# Patient Record
Sex: MALE | Race: WHITE
[De-identification: names, ages, dates, MRNs, and addresses within clinical notes are randomized per-mention and may not be internally consistent; named-entity substitution may affect disease eponyms.]

---

## 2017-05-16 ENCOUNTER — HOSPITAL ENCOUNTER (EMERGENCY)
Dept: HOSPITAL 74 - JERFT | Age: 72
Discharge: HOME | End: 2017-05-16
Payer: COMMERCIAL

## 2017-05-16 VITALS — HEART RATE: 88 BPM | DIASTOLIC BLOOD PRESSURE: 67 MMHG | SYSTOLIC BLOOD PRESSURE: 147 MMHG | TEMPERATURE: 98.5 F

## 2017-05-16 VITALS — BODY MASS INDEX: 30.9 KG/M2

## 2017-05-16 DIAGNOSIS — J44.9: ICD-10-CM
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
## 2017-05-16 DIAGNOSIS — S00.83XA: Primary | ICD-10-CM

## 2017-05-16 DIAGNOSIS — I10: ICD-10-CM
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
## 2017-05-16 NOTE — PDOC
History of Present Illness





- General


Chief Complaint: Injury


Stated Complaint: FALL


Time Seen by Provider: 05/16/17 14:02


History Source: Patient





- History of Present Illness


Occurred: reports: this afternoon


Pain Location: reports: face, head





Past History





- Past Medical History


Allergies/Adverse Reactions: 


 Allergies











Allergy/AdvReac Type Severity Reaction Status Date / Time


 


No Known Drug Allergies Allergy   Verified 04/09/14 23:51











Home Medications: 


Ambulatory Orders





Amlodipine Besylate [Norvasc] 5 mg PO DAILY 05/15/13 


Benazepril HCl 20 mg PO DAILY 05/15/13 


Sertraline HCl 50 mg PO DAILY 05/15/13 


Finasteride 5 mg PO DAILY 04/09/14 


Cyclobenzaprine HCl [Flexeril -] 10 mg PO HS PRN #7 tablet 04/10/14 


Naproxen [Naprosyn -] 500 mg PO BID #14 tablet 04/10/14 








Anemia: No


Asthma: No


Cancer: No


Cardiac Disorders: No


CVA: No


COPD: Yes


CHF: No


Dementia: No


Diabetes: No


GI Disorders:  (GB)


 Disorders: Yes ("BLADDER PROBLEMS")


HTN: Yes


Hypercholesterolemia: No


Liver Disease: No


Psychiatric Problems: Yes (SYMPTOMATIC NPH)


Seizures: No


Thyroid Disease: No





- Surgical History


Abdominal Surgery: No


Appendectomy: No


Cardiac Surgery: No


Cholecystectomy: Yes


Lung Surgery: No


Neurologic Surgery: No (NPH)


Orthopedic Surgery: No





- Immunization History


Immunization Up to Date: Yes





- Psycho/Social/Smoking Cessation Hx


Anxiety: No


Suicidal Ideation: No


Smoking Status: No


Smoking History: Never smoked


Have you smoked in the past 12 months: No


Number of Cigarettes Smoked Daily: 0


Information on smoking cessation initiated: No


Hx Alcohol Use: No


Drug/Substance Use Hx: No


Substance Use Type: Alcohol


Hx Substance Use Treatment: No





**Review of Systems





- Review of Systems


HEENTM: No: Blurred Vision


Respiratory: No: Shortness of Breath


Cardiac (ROS): No: Chest Pain


ABD/GI: No: Nausea, Vomiting


Neurological: No: Headache, Seizure, Dizziness





*Physical Exam





- Vital Signs


 Last Vital Signs











Temp Pulse Resp BP Pulse Ox


 


 98.5 F   88   20   147/67   94 L


 


 05/16/17 13:43  05/16/17 13:43  05/16/17 13:43  05/16/17 13:43  05/16/17 13:43














- Physical Exam


Comments: 





05/16/17 14:41


well appearing


General Appearance: Yes: Appropriately Dressed.  No: Apparent Distress


HEENT: positive: Normal Voice, Other (contusion to R temple)


Neck: positive: Supple.  negative: Tender


Integumentary: positive: Dry, Warm


Neurologic: positive: Fully Oriented, Alert, Normal Mood/Affect, Motor Strength 

5/5





ED Treatment Course





- RADIOLOGY


Radiology Studies Ordered: 














 Category Date Time Status


 


 HEAD CT WITHOUT CONTRAST [CT] Stat CT Scan  05/16/17 14:31 Ordered














Medical Decision Making





- Medical Decision Making


05/16/17 14:32


73 yo male, h/o HTN, not on any blood thinners, brought in by family for 

evaluation status post fall.  Patient states while in parking lot this afternoon

, he tripped on pavement and fell, hitting right side of his head against the 

ground.  As per daughter, bystanders reported that patient may have passed out 

though patient denies this.  Denies any headache, dizziness, nausea, vomiting 

or visual changes at this time.  No dizziness or chest pain prior to fall.





See exam





Head injury s/p mec fall


? LOC as per bystanders though pt denies this to me


Not on blood thinners


Asx at this time and stable in ED w/ unremarkable exam (of note, sat recorded 

at 94% at triage, was 96 % on RA in ED), pt reports no sob or CP


-will scan head


-anticipate discharge








05/16/17 14:37








05/16/17 14:41








05/16/17 15:32


CTH read as neg for acute pathology. Pt remains stable and well juan. Will 

discharge at this time to f/u with PMD. Reasons to return d/w pt and family. 





05/16/17 15:33








*DC/Admit/Observation/Transfer


Diagnosis at time of Disposition: 


Minor head injury


Qualifiers:


 Encounter type: initial encounter Qualified Code(s): S00.90XA - Unspecified 

superficial injury of unspecified part of head, initial encounter





- Discharge Dispostion


Disposition: HOME


Condition at time of disposition: Good





- Patient Instructions


Printed Discharge Instructions:  DI for Closed Head Injury


Additional Instructions: 


Your CT was negative today. Please return for worsening of symptoms

## 2019-11-21 ENCOUNTER — HOSPITAL ENCOUNTER (INPATIENT)
Dept: HOSPITAL 74 - JER | Age: 74
LOS: 1 days | Discharge: HOME | DRG: 56 | End: 2019-11-22
Attending: GENERAL ACUTE CARE HOSPITAL | Admitting: GENERAL ACUTE CARE HOSPITAL
Payer: COMMERCIAL

## 2019-11-21 VITALS — BODY MASS INDEX: 31.4 KG/M2

## 2019-11-21 DIAGNOSIS — J02.9: ICD-10-CM

## 2019-11-21 DIAGNOSIS — F41.8: ICD-10-CM

## 2019-11-21 DIAGNOSIS — N40.0: ICD-10-CM

## 2019-11-21 DIAGNOSIS — I10: ICD-10-CM

## 2019-11-21 DIAGNOSIS — G91.2: Primary | ICD-10-CM

## 2019-11-21 DIAGNOSIS — R26.81: ICD-10-CM

## 2019-11-21 DIAGNOSIS — N31.9: ICD-10-CM

## 2019-11-21 DIAGNOSIS — G91.1: ICD-10-CM

## 2019-11-21 DIAGNOSIS — E78.5: ICD-10-CM

## 2019-11-21 DIAGNOSIS — N39.0: ICD-10-CM

## 2019-11-21 DIAGNOSIS — G93.41: ICD-10-CM

## 2019-11-21 LAB
ALBUMIN SERPL-MCNC: 4 G/DL (ref 3.4–5)
ALP SERPL-CCNC: 104 U/L (ref 45–117)
ALT SERPL-CCNC: 30 U/L (ref 13–61)
ANION GAP SERPL CALC-SCNC: 8 MMOL/L (ref 8–16)
APPEARANCE UR: CLEAR
APTT BLD: 35.8 SECONDS (ref 25.2–36.5)
AST SERPL-CCNC: 23 U/L (ref 15–37)
BACTERIA # UR AUTO: 7.4 /HPF
BASOPHILS # BLD: 0.2 % (ref 0–2)
BILIRUB SERPL-MCNC: 0.5 MG/DL (ref 0.2–1)
BILIRUB UR STRIP.AUTO-MCNC: NEGATIVE MG/DL
BUN SERPL-MCNC: 11.7 MG/DL (ref 7–18)
CALCIUM SERPL-MCNC: 9.9 MG/DL (ref 8.5–10.1)
CASTS URNS QL MICRO: 7 /LPF (ref 0–8)
CHLORIDE SERPL-SCNC: 107 MMOL/L (ref 98–107)
CO2 SERPL-SCNC: 25 MMOL/L (ref 21–32)
COLOR UR: YELLOW
CREAT SERPL-MCNC: 1.1 MG/DL (ref 0.55–1.3)
DEPRECATED RDW RBC AUTO: 14.4 % (ref 11.9–15.9)
EOSINOPHIL # BLD: 1 % (ref 0–4.5)
EPITH CASTS URNS QL MICRO: 1.8 /HPF
GLUCOSE SERPL-MCNC: 117 MG/DL (ref 74–106)
HCT VFR BLD CALC: 42.6 % (ref 35.4–49)
HGB BLD-MCNC: 14.5 GM/DL (ref 11.7–16.9)
INR BLD: 1.13 (ref 0.83–1.09)
KETONES UR QL STRIP: (no result)
LEUKOCYTE ESTERASE UR QL STRIP.AUTO: (no result)
LYMPHOCYTES # BLD: 8 % (ref 8–40)
MCH RBC QN AUTO: 29.8 PG (ref 25.7–33.7)
MCHC RBC AUTO-ENTMCNC: 34.2 G/DL (ref 32–35.9)
MCV RBC: 87.2 FL (ref 80–96)
MONOCYTES # BLD AUTO: 17.2 % (ref 3.8–10.2)
NEUTROPHILS # BLD: 73.6 % (ref 42.8–82.8)
NITRITE UR QL STRIP: NEGATIVE
PH BLDV: 7.42 [PH] (ref 7.31–7.41)
PH UR: 5 [PH] (ref 5–8)
PLATELET # BLD AUTO: 198 K/MM3 (ref 134–434)
PMV BLD: 8.3 FL (ref 7.5–11.1)
POTASSIUM SERPLBLD-SCNC: 4 MMOL/L (ref 3.5–5.1)
PROT SERPL-MCNC: 7 G/DL (ref 6.4–8.2)
PROT UR QL STRIP: NEGATIVE
PROT UR QL STRIP: NEGATIVE
PT PNL PPP: 13.3 SEC (ref 9.7–13)
RBC # BLD AUTO: 3 /HPF (ref 0–4)
RBC # BLD AUTO: 4.88 M/MM3 (ref 4–5.6)
SODIUM SERPL-SCNC: 139 MMOL/L (ref 136–145)
SP GR UR: 1.02 (ref 1.01–1.03)
UROBILINOGEN UR STRIP-MCNC: 1 MG/DL (ref 0.2–1)
VENOUS PC02: 35.4 MMHG (ref 38–52)
VENOUS PO2: < 49 MMHG (ref 28–48)
WBC # BLD AUTO: 6.6 K/MM3 (ref 4–10)
WBC # UR AUTO: 10 /HPF (ref 0–5)

## 2019-11-21 RX ADMIN — SODIUM CHLORIDE SCH MLS/HR: 9 INJECTION, SOLUTION INTRAVENOUS at 22:05

## 2019-11-21 RX ADMIN — LISINOPRIL SCH MG: 10 TABLET ORAL at 22:06

## 2019-11-21 NOTE — PDOC
Documentation entered by Deborah Estrella SCRIBE, acting as scribe for David Lizama MD.








David Lizama MD:  This documentation has been prepared by the Sameer greer Nirvannie, SCRIBE, under my direction and personally reviewed by me in its 

entirety.  I confirm that the documentation accurately reflects all work, 

treatment, procedures, and medical decision making performed by me.  





Attending Attestation





- Resident


Resident Name: Noam Mosquera





- ED Attending Attestation


I have performed the following: I have examined & evaluated the patient, The 

case was reviewed & discussed with the resident, I agree w/resident's findings 

& plan, Exceptions are as noted





- HPI


HPI: 





11/21/19 11:13


The patient is a 74 year old male, with a significant past medical history of, 

who presents to the emergency department with, generalized weakness and gait 

disturbance. Patient was in PCP, Dr. Soria office today, at which time he 

was advised to report to the ED for generalized weakness and gait disturbance 

for possible infections vs CVA/TIA origin. While in the ED, patients only 

complaints are weakness, cough, and subjective fevers. 





Patient is not a great historian, but denies any headache, focal weakness or 

change in strength/sensation. He denies any recent nausea, vomiting, diarrhea 

or constipation. He denies any recent chest pain or shortness of breath. He 

denies any recent dysuria, frequency, urgency or hematuria.





Allergies: NKDA


Primary Care Physician: Dr. Hernandez








- Physicial Exam


PE: 





11/21/19 12:09


Agree with resident exam 





- Medical Decision Making





11/21/19 12:09


74-year-old male with multiple medical problems including normal pressure 

hydrocephalus presents emergency department from his primary care physician's 

office after he was found to have a very unsteady gait.  On arrival to the 

emergency department patient is tachycardic to 110 and almost febrile rectally 

to 99.6.





Concern for neurologic etiology such as CVA causing gait instability or due to 

NPH (although Dr. Hernandez reports gait is much worse than baseline) versus 

possible infectious etiology given borderline fever and tachycardia.  Will 

initiate a broad infectious and neurologic work-up.





Anticipate admission.





11/21/19 13:01


CTH stable


W/u with possible UTI? Pt covered with ceftriaxone


Persistent concern for stroke as well, pt to be evaluated by Dr. Briseno (case 

discussed)


MRI brain non con ordered





Case discussed with Dr. Martin Garcia, pt admitted to Dr. Watkins





Case discussed in detail with admitting physician including history, physical 

exam and ancillary studies.





Admitting physician has assumed care for the patient, will follow all pending 

diagnostics and will complete the evaluation and treatment.

## 2019-11-21 NOTE — PDOC
History of Present Illness





- General


Chief Complaint: Weakness


Stated Complaint: Weakness


Time Seen by Provider: 11/21/19 10:09


History Source: Patient


Exam Limitations: No Limitations





- History of Present Illness


Initial Comments: 





11/21/19 11:28


Patient is a 74M with history of HLD, NPH who was sent in today by his PCP Dr Hernandez for increased weakness and unsteady gait. Patient states that he's been 

feeling worse for the past week, and states he has had increasing sore throat 

and cough. Dr Hernandez reports the patient's gait was much worse than prior at 

his visit today, stating that he required holding on to objects for balance. 

Patient endorses associated urinary frequency. Denies fevers, chills, nausea, 

vomiting. Denies headache, neck pain, chest pain, abdominal pain, dysuria, and 

leg pain. Denies recent falls.





Neuro: Hoenig - NYP





Past History





- Past Medical History


Allergies/Adverse Reactions: 


 Allergies











Allergy/AdvReac Type Severity Reaction Status Date / Time


 


No Known Drug Allergies Allergy   Verified 11/21/19 10:18











Home Medications: 


Ambulatory Orders





Benazepril HCl 20 mg PO TID 05/15/13 


Finasteride 5 mg PO DAILY 04/09/14 


Atorvastatin Ca [Lipitor] 20 mg PO HS 11/21/19 


Ranitidine HCl [Zantac] 150 mg PO BID 11/21/19 


Sertraline HCl [Zoloft] 100 mg PO DAILY 11/21/19 


Solifenacin Succinate [Vesicare -] 5 mg PO DAILY 11/21/19 


Tamsulosin HCl [Flomax] 0.4 mg PO DAILY 11/21/19 








Anemia: No


Asthma: No


Cancer: No


Cardiac Disorders: No


CVA: No


COPD: No


CHF: No


Dementia: No


Diabetes: No


GI Disorders:  (GB)


 Disorders: Yes ("BLADDER PROBLEMS")


HTN: Yes


Hypercholesterolemia: Yes


Liver Disease: No


Psychiatric Problems: Yes (SYMPTOMATIC NPH)


Seizures: No


Thyroid Disease: No





- Surgical History


Abdominal Surgery: No


Appendectomy: No


Cardiac Surgery: No


Cholecystectomy: Yes


Lung Surgery: No


Neurologic Surgery: No (NPH)


Orthopedic Surgery: No





- Immunization History


Immunization Up to Date: Yes





- Psycho Social/Smoking Cessation Hx


Smoking Status: No


Smoking History: Never smoked


Have you smoked in the past 12 months: No


Number of Cigarettes Smoked Daily: 0


Hx Alcohol Use: No


Drug/Substance Use Hx: No


Substance Use Type: Alcohol


Hx Substance Use Treatment: No





**Review of Systems





- Review of Systems


Able to Perform ROS?: Yes


Comments:: 





11/21/19 11:32


GENERAL/CONSTITUTIONAL: No fever or chills. +weakness.


HEAD, EYES, EARS, NOSE AND THROAT: No change in vision. No sore throat.


CARDIOVASCULAR: No chest pain or shortness of breath


RESPIRATORY: No cough, wheezing, or hemoptysis.


GASTROINTESTINAL: No nausea, vomiting, diarrhea or constipation.


GENITOURINARY: No dysuria, frequency, or change in urination.


MUSCULOSKELETAL: No joint or muscle swelling or pain. No neck or back pain.


SKIN: No rash


NEUROLOGIC: No headache, vertigo, loss of consciousness, or change in strength/

sensation.


ENDOCRINE: No increased thirst. No abnormal weight change


HEMATOLOGIC/LYMPHATIC: No anemia, easy bleeding, or history of blood clots.


ALLERGIC/IMMUNOLOGIC: No hives or skin allergy.








*Physical Exam





- Vital Signs


 Last Vital Signs











Temp Pulse Resp BP Pulse Ox


 


 99.6 F   110 H  20   133/75   96 


 


 11/21/19 10:00  11/21/19 10:00  11/21/19 10:00  11/21/19 10:00  11/21/19 10:00














- Physical Exam


Comments: 





11/21/19 11:32


GENERAL: Awake, alert, and fully oriented, in no acute distress


HEAD: No signs of trauma, normocephalic, atraumatic


EYES: PERRLA, EOMI, sclera anicteric, conjunctiva clear


ENT: Auricles normal inspection, hearing grossly normal, nares patent, 

oropharynx clear without


exudates. Dry mucosa


NECK: Normal ROM, supple, no lymphadenopathy, JVD, or masses


LUNGS: No distress, speaks full sentences, clear to auscultation bilaterally


HEART: Regular rate and rhythm, normal S1 and S2, no murmurs, rubs or gallops, 

peripheral pulses normal and equal bilaterally.


ABDOMEN: Soft, nontender, normoactive bowel sounds.  No guarding, no rebound.  

No masses


EXTREMITIES: Normal inspection, Normal range of motion, no edema.  No clubbing 

or cyanosis.


NEUROLOGICAL: Cranial nerves II through XII grossly intact.  Normal speech, 

gait deferred after reports of instability, no focal sensorimotor deficits


SKIN: Warm, Dry, normal turgor, no rashes or lesions noted.








ED Treatment Course





- LABORATORY


CBC & Chemistry Diagram: 


 11/21/19 10:15





 11/21/19 10:15





- RADIOLOGY


Radiology Studies Ordered: 














 Category Date Time Status


 


 HEAD CT WITHOUT CONTRAST [CT] Stat CT Scan  11/21/19 10:20 Ordered


 


 CHEST X-RAY PORTABLE* [RAD] Stat Radiology  11/21/19 10:19 Ordered














Medical Decision Making





- Medical Decision Making





11/21/19 11:33


Patient is 74M with history of NPH, HLD here today with weakness and gait 

disturbance. Vitals notable for tachycardia, afebrile. Physical exam shows 

evidence of dry mucosa. DDx includes, but is not limited to: NPH progression, 

CVA, UTI, other infectious source causing sepsis. Broad workup initiated. Dr Hernandez contacted for further historical information, states that he would like 

patient admitted for stroke rule out if negative workup. Will treat with 1L NS.





EKG shows sinus tachycardia. No st elevations/depressions. Normal intervals





CBC normal


CMP normal


Trop negative


VBG reassuring


Lactate negative


Head CT, CXR pending.


11/21/19 12:03


CXR negative for acute pathology


Head CT shows evidence of NPH, no acute stroke


11/21/19 13:01


, suspect dehydration as primary process. Will cover with ceftriaxone for 

possible UTI. Will flu swab.


D/W Dr Garcia, accepted to [facility]'s service.


D/W Finn, no need for stroke bed at this time. Will see.








Discharge





- Discharge Information


Problems reviewed: Yes


Clinical Impression/Diagnosis: 


 NPH (normal pressure hydrocephalus), Gait instability








- Admission


Yes





- Follow up/Referral


Referrals: 


Black Hernandez MD [Primary Care Provider] - 





- Patient Discharge Instructions





- Post Discharge Activity

## 2019-11-21 NOTE — CONSULT
Consult - text type





- Consultation


Consultation Note: 


NEUROLOGY CONSULTATION GREATLY APPRECIATED:





Events reviewed. Serial MRIs since 2006 reviewed. Pt examined with daughters at 

bedside who aide in history.


This 73 yo RH  male  currently lives with his daughter in [place].


8 year history of slowly, progressive gait dysfunction, dramatically worse over 

the last two years. Daughters indicate flexed, shuffling wide-based gait.


Followed at [place] since presenting with stuttering speech 6 years ago. 

Carries diagnosis of "Normal pressure hydrocephalus" (NPH).


Had Lumbar Puncture years ago. Family does not recall results. Treatments never 

offered. 


Progressive urological difficulties with increasing frequency and recent 

incontinence on multiple bladder meds without sig efficicacy. 


Daughters concerned about memory loss and "muddled" thinking. 


Now admitted with 7-10 days of severe worsening of ataxia - holding onto walls, 

onset of incontinence and fevers. 


Found to have 10 WBC in the urine and bronchitic cough - Given IV ceftriaxone x 

1. 





CT of the head and MRI of brain (not yet reported) are reviewed and compared to 

serial studies: Moderate hydrocephalus L > R including lateral and third 

ventricles. No dilation of sylvian fissure appreciated. No sig cortical 

atrophy. Probable old L posterior-temporal ischemic changes. 





LILIAM: Neck supple. Mild megencephaly with frontal bossing. 


NEURO: Mild OMS. Ox x 3. Recalls 1/3 @ 3 min. No frontal release findings. 

Fluent speech. 


           CNII-CNXII: Normal. 


           Motor: No drift, tremor, or cogwheel rigidity. Normal strength 

throughout. Normal reflexes. Toes downgoing.


           Coordination: No FTN dystaxia. 


           Sensation: Reduced vibration feet. Romberg -


           Gait: Frozen, wide-based, spontaneous retropulsion.





Impression: Obstructive hydrocephalus


                Neuroimaging indicates slowly increasing hydrocephalus > 10 

years


                Possible L posterior temporal CVA (old) which may explain 

history of speech changes 


                Neurogenic bladder c/w Obstructive hydrocephalus


                All worsened by Toxic-Metabolic Encephalopathy (TME)





Plan: Continue hydration and antibiotics 


        Check B12, TSH, RPR 


        PT for gait with walker (Rx given to daughter)


        Neuro f/u as outpatient. Possible NS opinion when stable


        Urology consultation for urodynamic studies as outpatient 





Thank you very much,


Nick Briseno MD

## 2019-11-21 NOTE — PN
Teaching Attending Note


Name of Resident: Martin Garcia





ATTENDING PHYSICIAN STATEMENT





I saw and evaluated the patient.


I reviewed the resident's note and discussed the case with the resident.


I agree with the resident's findings and plan as documented.








SUBJECTIVE: Complains of nasal congestion, sore throat. No fever/chills/nausea/

vomiting. Admits to fogginess, urinary incontinence and unsteady gait worsening 

over recent months. 








OBJECTIVE: Low grade Temp - T max 99.6. Hemodynamicaly stable.





 Last Vital Signs











Temp Pulse Resp BP Pulse Ox


 


 99 F   104 H  20   111/84   96 


 


 11/21/19 12:26  11/21/19 12:26  11/21/19 12:26  11/21/19 12:26 11/21/19 12:26








HEENT - Atraumatic, Normocephalic. Some pharyngeal erythema, no exudate. Mild 

tender cervical lymphadenopathy


Heart - S1, S2, RRR


Lungs - Clear to auscultation


Abdomen - Soft, non-tender. Bowel Sounds normal. 


Extremities - no edema, no calf tenderness.


Neuro - AAO x 2-3. Tone/Power normal all extremities. Unsteady wide-based gait. 





 Laboratory Results - last 24 hr











  11/21/19 11/21/19 11/21/19





  10:15 10:15 10:15


 


WBC   6.6 


 


RBC   4.88 


 


Hgb   14.5 


 


Hct   42.6 


 


MCV   87.2 


 


MCH   29.8 


 


MCHC   34.2 


 


RDW   14.4 


 


Plt Count   198  D 


 


MPV   8.3 


 


Absolute Neuts (auto)   4.8 


 


Neutrophils %   73.6 


 


Lymphocytes %   8.0  D 


 


Monocytes %   17.2 H D 


 


Eosinophils %   1.0 


 


Basophils %   0.2 


 


Nucleated RBC %   0 


 


PT with INR  13.30 H  


 


INR  1.13 H  


 


PTT (Actin FS)  35.8  


 


VBG pH   


 


POC VBG pCO2   


 


POC VBG pO2   


 


VBG HCO3   


 


VBG O2 Sat (Jj)   


 


VBG Base Excess   


 


Sodium    139


 


Potassium    4.0


 


Chloride    107


 


Carbon Dioxide    25


 


Anion Gap    8


 


BUN    11.7


 


Creatinine    1.1


 


Est GFR (CKD-EPI)AfAm    76.78


 


Est GFR (CKD-EPI)NonAf    66.24


 


Random Glucose    117 H


 


Lactic Acid   


 


Calcium    9.9


 


Total Bilirubin    0.5


 


AST    23


 


ALT    30


 


Alkaline Phosphatase    104


 


Troponin I    < 0.02


 


Total Protein    7.0


 


Albumin    4.0


 


Urine Color   


 


Urine Appearance   


 


Urine pH   


 


Ur Specific Gravity   


 


Urine Protein   


 


Urine Glucose (UA)   


 


Urine Ketones   


 


Urine Blood   


 


Urine Nitrite   


 


Urine Bilirubin   


 


Urine Urobilinogen   


 


Ur Leukocyte Esterase   


 


Urine WBC (Auto)   


 


Urine RBC (Auto)   


 


Urine Casts (Auto)   


 


U Epithel Cells (Auto)   


 


Urine Bacteria (Auto)   


 


Influenza A (Rapid)   


 


Influenza B (Rapid)   














  11/21/19 11/21/19 11/21/19





  10:15 10:23 12:05


 


WBC   


 


RBC   


 


Hgb   


 


Hct   


 


MCV   


 


MCH   


 


MCHC   


 


RDW   


 


Plt Count   


 


MPV   


 


Absolute Neuts (auto)   


 


Neutrophils %   


 


Lymphocytes %   


 


Monocytes %   


 


Eosinophils %   


 


Basophils %   


 


Nucleated RBC %   


 


PT with INR   


 


INR   


 


PTT (Actin FS)   


 


VBG pH  7.42 H  


 


POC VBG pCO2  35.4 L  


 


POC VBG pO2  < 49 H  


 


VBG HCO3  22.7 L  


 


VBG O2 Sat (Jj)  80.0  


 


VBG Base Excess  -0.7  


 


Sodium   


 


Potassium   


 


Chloride   


 


Carbon Dioxide   


 


Anion Gap   


 


BUN   


 


Creatinine   


 


Est GFR (CKD-EPI)AfAm   


 


Est GFR (CKD-EPI)NonAf   


 


Random Glucose   


 


Lactic Acid   1.4 


 


Calcium   


 


Total Bilirubin   


 


AST   


 


ALT   


 


Alkaline Phosphatase   


 


Troponin I   


 


Total Protein   


 


Albumin   


 


Urine Color   


 


Urine Appearance   


 


Urine pH   


 


Ur Specific Gravity   


 


Urine Protein   


 


Urine Glucose (UA)   


 


Urine Ketones   


 


Urine Blood   


 


Urine Nitrite   


 


Urine Bilirubin   


 


Urine Urobilinogen   


 


Ur Leukocyte Esterase   


 


Urine WBC (Auto)   


 


Urine RBC (Auto)   


 


Urine Casts (Auto)   


 


U Epithel Cells (Auto)   


 


Urine Bacteria (Auto)   


 


Influenza A (Rapid)    Negative


 


Influenza B (Rapid)    Negative














  11/21/19





  12:25


 


WBC 


 


RBC 


 


Hgb 


 


Hct 


 


MCV 


 


MCH 


 


MCHC 


 


RDW 


 


Plt Count 


 


MPV 


 


Absolute Neuts (auto) 


 


Neutrophils % 


 


Lymphocytes % 


 


Monocytes % 


 


Eosinophils % 


 


Basophils % 


 


Nucleated RBC % 


 


PT with INR 


 


INR 


 


PTT (Actin FS) 


 


VBG pH 


 


POC VBG pCO2 


 


POC VBG pO2 


 


VBG HCO3 


 


VBG O2 Sat (Jj) 


 


VBG Base Excess 


 


Sodium 


 


Potassium 


 


Chloride 


 


Carbon Dioxide 


 


Anion Gap 


 


BUN 


 


Creatinine 


 


Est GFR (CKD-EPI)AfAm 


 


Est GFR (CKD-EPI)NonAf 


 


Random Glucose 


 


Lactic Acid 


 


Calcium 


 


Total Bilirubin 


 


AST 


 


ALT 


 


Alkaline Phosphatase 


 


Troponin I 


 


Total Protein 


 


Albumin 


 


Urine Color  Yellow


 


Urine Appearance  Clear


 


Urine pH  5.0


 


Ur Specific Gravity  1.021


 


Urine Protein  Negative


 


Urine Glucose (UA)  Negative


 


Urine Ketones  Trace H


 


Urine Blood  Negative


 


Urine Nitrite  Negative


 


Urine Bilirubin  Negative


 


Urine Urobilinogen  1.0


 


Ur Leukocyte Esterase  1+ H


 


Urine WBC (Auto)  10


 


Urine RBC (Auto)  3


 


Urine Casts (Auto)  7


 


U Epithel Cells (Auto)  1.8


 


Urine Bacteria (Auto)  7.4


 


Influenza A (Rapid) 


 


Influenza B (Rapid) 








 Current Medications











Generic Name Dose Route Start Last Admin





  Trade Name Jodi  PRN Reason Stop Dose Admin


 


Heparin Sodium (Porcine)  5,000 unit  11/21/19 22:00  





  Heparin -  SQ   





  TID [facility]   





     





     





     





     


 


Sodium Chloride  1,000 mls @ 100 mls/hr  11/21/19 13:45  





  Normal Saline -  IV   





  ASDIR [facility]   





     





     





     





     





 


 Home Medications











 Medication  Instructions  Recorded


 


Benazepril HCl 10 mg PO TID 05/15/13


 


Finasteride 5 mg PO DAILY 04/09/14


 


Atorvastatin Ca [Lipitor] 20 mg PO HS 11/21/19


 


Ranitidine HCl [Zantac] 150 mg PO BID 11/21/19


 


Sertraline HCl [Zoloft] 150 mg PO DAILY 11/21/19


 


Solifenacin Succinate [Vesicare -] 5 mg PO DAILY 11/21/19


 


Tamsulosin HCl [Flomax] 0.4 mg PO DAILY 11/21/19

















ASSESSMENT AND PLAN:





74 year old male with history of HTN, HLD, NPH, BPH, and remote TB, presents 

with worsening gait instability over the past 2 months, with associated 

confusion and urinary incontinence.





1. NPH with progression of symptoms - gait instability, confusion, urinary 

incontinence.


CT Brain - confirms dilatation of lateral/3rd/4th ventricles.


Neuro eval requested for further investigations including possible LP.


Continue Vesicare.


PT





2. Pharyngitis, likely viral. Low grade fever - no clear bacterial infection


Recent sick contacts with Strep. Flu neg. 


CXR - no infiltrate. Pharyngeal Swab/RapidStrep and Blood Cx requested.


Received IV Ceftriaxone in ED. 





3. HTN - Continue Benazepril





4. BPH - continue Flomax, Finasteride





5. HLD - on Statin.





6. Depression/Anxiety - on Zoloft.





DVT Px - SCDs. Heparin SQ held in event patient may require LP.

## 2019-11-21 NOTE — HP
CHIEF COMPLAINT: Worsening gait, generalized weakness 





PCP: Dr SATHISH Hernandez 





HISTORY OF PRESENT ILLNESS:


  Pt is a 73 y/o M with a significant PMH of HTN, HLD, NPH, and Tuberculosis (

as a child) who presented to [facility] at the behest of his primary care physician 

due to worsening gait instability. Per patient and daughters at bedside, pt has 

been suffering from a wide-based gait for over 1 year. However, gait has 

progressively gotten worse since this past September. Pt has also been 

experiencing urinary incontinence during this juncture. Furthermore, pt does 

have a recent + exposure to strep throat (daughter along with grandchildren). 

Pt does endorse a sore throat. Endorses subjective fevers. Denies headache, 

changes in vision, numbness/tingling, chest pain, shortness of breath. 





PMH as above


Social- Denies Tobacco/Alcohol/Illicit drug use


Surg- Cholecystectomy


FH- Mother  at 23 due to tuberculous meningitis. Father Colon cancer


NKDA











ER course was notable for:


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 


(2) 1 gm ceftriaxone











Allergies





No Known Drug Allergies Allergy (Verified 19 10:18)


 








HOME MEDICATIONS:


 Home Medications











 Medication  Instructions  Recorded


 


Benazepril HCl 20 mg PO TID 05/15/13


 


Finasteride 5 mg PO DAILY 14


 


Atorvastatin Ca [Lipitor] 20 mg PO HS 19


 


Ranitidine HCl [Zantac] 150 mg PO BID 19


 


Sertraline HCl [Zoloft] 100 mg PO DAILY 19


 


Solifenacin Succinate [Vesicare -] 5 mg PO DAILY 19


 


Tamsulosin HCl [Flomax] 0.4 mg PO DAILY 19








REVIEW OF SYSTEMS


CONSTITUTIONAL: 


present   generalized weakness


HEENT: 


Absent:  rhinorrhea, nasal congestion, throat pain, throat swelling, difficulty 

swallowing, mouth swelling, ear pain, eye pain, visual changes


CARDIOVASCULAR: 


Absent: chest pain, syncope, palpitations, irregular heart rate, lightheadedness

, peripheral edema


RESPIRATORY: 


Absent: cough, shortness of breath, dyspnea with exertion, orthopnea, wheezing, 

stridor, hemoptysis


GASTROINTESTINAL:


Absent: abdominal pain, abdominal distension, nausea, vomiting, diarrhea, 

constipation, melena, hematochezia


GENITOURINARY: 


present:, frequency, incontinence 


MUSCULOSKELETAL: 


Absent: myalgia, arthralgia, joint swelling, back pain, neck pain


SKIN: 


Absent: rash, itching, pallor


HEMATOLOGIC/IMMUNOLOGIC: 


Absent: easy bleeding, easy bruising, lymphadenopathy, frequent infections


ENDOCRINE:


Absent: unexplained weight gain, unexplained weight loss, heat intolerance, 

cold intolerance


NEUROLOGIC: 


present focal weakness  unsteady gait, seizure,  bladder incontinence


PSYCHIATRIC: 


Absent: anxiety, depression, suicidal or homicidal ideation, hallucinations.








PHYSICAL EXAMINATION


 Vital Signs - 24 hr











  19





  10:00 10:37 12:26


 


Temperature 99.6 F  99 F


 


Pulse Rate 110 H  


 


Pulse Rate [   104 H





Left Radial]   


 


Respiratory 20  20





Rate   


 


Blood Pressure 133/75  


 


Blood Pressure   111/84





[Right Arm]   


 


O2 Sat by Pulse 96 96 96





Oximetry (%)   











GENERAL: Pleasant, NAD


HEAD: Normal with no signs of trauma.


EYES: EOMI Sclera Clear 


EARS, NOSE, THROAT: Dry MMM


NECK: No cervical lymphadenopathy appreciated. 


LUNGS: CTA b/l 


HEART: RRR S1S2


ABDOMEN: Soft NDNT No guarding or rigidity  


MUSCULOSKELETAL: FROM


LOWER EXTREMITIES: No significant pedel edema 


NEUROLOGICAL: CN 2-12 are intact. Upper extremities biceps/triceps 5/5. 

Sensation intact to light touch. Reflex 2+. LE: Strength 5/5, Dorsiflexion/

plantar flexion both lower extremities 5+. Sensation intact as well in lower 

extremities. 


PSYCHIATRIC: Cooperative. Good eye contact. Appropriate mood and affect.


SKIN: Warm, dry, normal turgor, no rashes or lesions noted, normal capillary 

refill. 


 Laboratory Results - last 24 hr











  19





  10:15 10:15 10:15


 


WBC   6.6 


 


RBC   4.88 


 


Hgb   14.5 


 


Hct   42.6 


 


MCV   87.2 


 


MCH   29.8 


 


MCHC   34.2 


 


RDW   14.4 


 


Plt Count   198  D 


 


MPV   8.3 


 


Absolute Neuts (auto)   4.8 


 


Neutrophils %   73.6 


 


Lymphocytes %   8.0  D 


 


Monocytes %   17.2 H D 


 


Eosinophils %   1.0 


 


Basophils %   0.2 


 


Nucleated RBC %   0 


 


PT with INR  13.30 H  


 


INR  1.13 H  


 


PTT (Actin FS)  35.8  


 


VBG pH   


 


POC VBG pCO2   


 


POC VBG pO2   


 


VBG HCO3   


 


VBG O2 Sat (Jj)   


 


VBG Base Excess   


 


Sodium    139


 


Potassium    4.0


 


Chloride    107


 


Carbon Dioxide    25


 


Anion Gap    8


 


BUN    11.7


 


Creatinine    1.1


 


Est GFR (CKD-EPI)AfAm    76.78


 


Est GFR (CKD-EPI)NonAf    66.24


 


Random Glucose    117 H


 


Lactic Acid   


 


Calcium    9.9


 


Total Bilirubin    0.5


 


AST    23


 


ALT    30


 


Alkaline Phosphatase    104


 


Troponin I    < 0.02


 


Total Protein    7.0


 


Albumin    4.0


 


Urine Color   


 


Urine Appearance   


 


Urine pH   


 


Ur Specific Gravity   


 


Urine Protein   


 


Urine Glucose (UA)   


 


Urine Ketones   


 


Urine Blood   


 


Urine Nitrite   


 


Urine Bilirubin   


 


Urine Urobilinogen   


 


Ur Leukocyte Esterase   


 


Urine WBC (Auto)   


 


Urine RBC (Auto)   


 


Urine Casts (Auto)   


 


U Epithel Cells (Auto)   


 


Urine Bacteria (Auto)   














  19





  10:15 10:23 12:25


 


WBC   


 


RBC   


 


Hgb   


 


Hct   


 


MCV   


 


MCH   


 


MCHC   


 


RDW   


 


Plt Count   


 


MPV   


 


Absolute Neuts (auto)   


 


Neutrophils %   


 


Lymphocytes %   


 


Monocytes %   


 


Eosinophils %   


 


Basophils %   


 


Nucleated RBC %   


 


PT with INR   


 


INR   


 


PTT (Actin FS)   


 


VBG pH  7.42 H  


 


POC VBG pCO2  35.4 L  


 


POC VBG pO2  < 49 H  


 


VBG HCO3  22.7 L  


 


VBG O2 Sat (Jj)  80.0  


 


VBG Base Excess  -0.7  


 


Sodium   


 


Potassium   


 


Chloride   


 


Carbon Dioxide   


 


Anion Gap   


 


BUN   


 


Creatinine   


 


Est GFR (CKD-EPI)AfAm   


 


Est GFR (CKD-EPI)NonAf   


 


Random Glucose   


 


Lactic Acid   1.4 


 


Calcium   


 


Total Bilirubin   


 


AST   


 


ALT   


 


Alkaline Phosphatase   


 


Troponin I   


 


Total Protein   


 


Albumin   


 


Urine Color    Yellow


 


Urine Appearance    Clear


 


Urine pH    5.0


 


Ur Specific Gravity    1.021


 


Urine Protein    Negative


 


Urine Glucose (UA)    Negative


 


Urine Ketones    Trace H


 


Urine Blood    Negative


 


Urine Nitrite    Negative


 


Urine Bilirubin    Negative


 


Urine Urobilinogen    1.0


 


Ur Leukocyte Esterase    1+ H


 


Urine WBC (Auto)    10


 


Urine RBC (Auto)    3


 


Urine Casts (Auto)    7


 


U Epithel Cells (Auto)    1.8


 


Urine Bacteria (Auto)    7.4











ASSESSMENT/PLAN:


 Pt is a 73 y/o M with a significant PMH of HTN, HLD, NPH, and Tuberculosis (as 

a child) who presented to [facility] at the behest of his primary care physician 

due to worsening gait instability.





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


-CT Head---> Marked to moderate dilation of the lateral and third ventricles 

and a small fourth ventricle is again noted. Findings suggestive of NPH vs. 

Stenosis of  aqueduct of sylvius


-Dr Briseno consulted. Appreciate recs.


-Possible Lumbar Puncture per neurology recs. 








# UTI


-Leuk Est 1+, 10 wbcs. Not significant however may explain patient's altered 

mental status and generalized weakness. 


-Received 1 gram ceftriaxone in ED. 


-Trend CBC.  


-Will send blood cultures of patient spikes temp > 100.4. 


-CXR No infiltrates, congestive changes, or any acute pathology. 





#FEN


-NS@100cc/hr


-Monitor Electrolytes


-Sodium Controlled Diet





#DVT ppx:


-SCDs. No hep as patient may go for lumbar puncture








#Dispo:


-Med-Surg 

















Visit type





- Emergency Visit


Emergency Visit: Yes


ED Registration Date: 19


Care time: The patient presented to the Emergency Department on the above date 

and was hospitalized for further evaluation of their emergent condition.





- New Patient


This patient is new to me today: Yes


Date on this admission: 19





- Critical Care


Critical Care patient: No





ATTENDING PHYSICIAN STATEMENT





I saw and evaluated the patient.


I reviewed the resident's note and discussed the case with the resident.


I agree with the resident's findings and plan as documented.








SUBJECTIVE:








OBJECTIVE:








ASSESSMENT AND PLAN:

## 2019-11-22 VITALS — HEART RATE: 99 BPM | DIASTOLIC BLOOD PRESSURE: 78 MMHG | SYSTOLIC BLOOD PRESSURE: 148 MMHG | TEMPERATURE: 97.8 F

## 2019-11-22 LAB
ALBUMIN SERPL-MCNC: 3.4 G/DL (ref 3.4–5)
ALP SERPL-CCNC: 85 U/L (ref 45–117)
ALT SERPL-CCNC: 26 U/L (ref 13–61)
ANION GAP SERPL CALC-SCNC: 4 MMOL/L (ref 8–16)
APTT BLD: 32.2 SECONDS (ref 25.2–36.5)
AST SERPL-CCNC: 17 U/L (ref 15–37)
BASOPHILS # BLD: 0.3 % (ref 0–2)
BILIRUB SERPL-MCNC: 0.4 MG/DL (ref 0.2–1)
BUN SERPL-MCNC: 19.5 MG/DL (ref 7–18)
CALCIUM SERPL-MCNC: 9.4 MG/DL (ref 8.5–10.1)
CHLORIDE SERPL-SCNC: 110 MMOL/L (ref 98–107)
CO2 SERPL-SCNC: 27 MMOL/L (ref 21–32)
CREAT SERPL-MCNC: 1.2 MG/DL (ref 0.55–1.3)
DEPRECATED RDW RBC AUTO: 14.1 % (ref 11.9–15.9)
EOSINOPHIL # BLD: 1.5 % (ref 0–4.5)
GLUCOSE SERPL-MCNC: 106 MG/DL (ref 74–106)
HCT VFR BLD CALC: 37.9 % (ref 35.4–49)
HGB BLD-MCNC: 12.8 GM/DL (ref 11.7–16.9)
INR BLD: 1.15 (ref 0.83–1.09)
LYMPHOCYTES # BLD: 18 % (ref 8–40)
MAGNESIUM SERPL-MCNC: 2.2 MG/DL (ref 1.8–2.4)
MCH RBC QN AUTO: 29.6 PG (ref 25.7–33.7)
MCHC RBC AUTO-ENTMCNC: 33.8 G/DL (ref 32–35.9)
MCV RBC: 87.3 FL (ref 80–96)
MONOCYTES # BLD AUTO: 16 % (ref 3.8–10.2)
NEUTROPHILS # BLD: 64.2 % (ref 42.8–82.8)
PHOSPHATE SERPL-MCNC: 3.5 MG/DL (ref 2.5–4.9)
PLATELET # BLD AUTO: 181 K/MM3 (ref 134–434)
PMV BLD: 7.7 FL (ref 7.5–11.1)
POTASSIUM SERPLBLD-SCNC: 3.9 MMOL/L (ref 3.5–5.1)
PROT SERPL-MCNC: 6 G/DL (ref 6.4–8.2)
PT PNL PPP: 13.6 SEC (ref 9.7–13)
RBC # BLD AUTO: 4.34 M/MM3 (ref 4–5.6)
SODIUM SERPL-SCNC: 140 MMOL/L (ref 136–145)
WBC # BLD AUTO: 5.1 K/MM3 (ref 4–10)

## 2019-11-22 RX ADMIN — SODIUM CHLORIDE SCH MLS/HR: 9 INJECTION, SOLUTION INTRAVENOUS at 09:07

## 2019-11-22 RX ADMIN — LISINOPRIL SCH MG: 10 TABLET ORAL at 09:08

## 2019-11-22 NOTE — PN
Teaching Attending Note


Name of Resident: Darryl Rowell





ATTENDING PHYSICIAN STATEMENT





I saw and evaluated the patient.


I reviewed the resident's note and discussed the case with the resident.


I agree with the resident's findings and plan as documented.








SUBJECTIVE:No fever/chills/nausea/vomiting. Admits to fogginess, urinary 

incontinence and unsteady gait worsening over recent months. 








OBJECTIVE: Low grade Temp - T max 99.6. Hemodynamically stable.





 Last Vital Signs











Temp Pulse Resp BP Pulse Ox


 


 98.2 F   91 H  20   135/82   99 


 


 11/22/19 10:00  11/22/19 10:00  11/22/19 10:00  11/22/19 10:00  11/22/19 10:00











Heart - S1, S2, RRR


Lungs - Clear to auscultation


Abdomen - Soft, non-tender. Bowel Sounds normal. 


Extremities - no edema, no calf tenderness.


Neuro - AAO x 2-3. Tone/Power normal all extremities. Unsteady wide-based gait. 





 Laboratory Results - last 24 hr











  11/21/19 11/22/19 11/22/19





  14:50 06:30 06:30


 


WBC   5.1 


 


RBC   4.34 


 


Hgb   12.8 


 


Hct   37.9 


 


MCV   87.3 


 


MCH   29.6 


 


MCHC   33.8 


 


RDW   14.1 


 


Plt Count   181 


 


MPV   7.7 


 


Absolute Neuts (auto)   3.3 


 


Neutrophils %   64.2 


 


Lymphocytes %   18.0  D 


 


Monocytes %   16.0 H 


 


Eosinophils %   1.5 


 


Basophils %   0.3 


 


Nucleated RBC %   0 


 


PT with INR    13.60 H


 


INR    1.15 H


 


PTT (Actin FS)    32.2


 


Sodium   


 


Potassium   


 


Chloride   


 


Carbon Dioxide   


 


Anion Gap   


 


BUN   


 


Creatinine   


 


Est GFR (CKD-EPI)AfAm   


 


Est GFR (CKD-EPI)NonAf   


 


Random Glucose   


 


Calcium   


 


Phosphorus   


 


Magnesium   


 


Total Bilirubin   


 


AST   


 


ALT   


 


Alkaline Phosphatase   


 


Total Protein   


 


Albumin   


 


Vitamin B12   


 


TSH   


 


RPR Titer   


 


Group A Strep Rapid  Negative  














  11/22/19 11/22/19





  06:30 11:11


 


WBC  


 


RBC  


 


Hgb  


 


Hct  


 


MCV  


 


MCH  


 


MCHC  


 


RDW  


 


Plt Count  


 


MPV  


 


Absolute Neuts (auto)  


 


Neutrophils %  


 


Lymphocytes %  


 


Monocytes %  


 


Eosinophils %  


 


Basophils %  


 


Nucleated RBC %  


 


PT with INR  


 


INR  


 


PTT (Actin FS)  


 


Sodium  140 


 


Potassium  3.9 


 


Chloride  110 H 


 


Carbon Dioxide  27 


 


Anion Gap  4 L 


 


BUN  19.5 H 


 


Creatinine  1.2 


 


Est GFR (CKD-EPI)AfAm  69.11 


 


Est GFR (CKD-EPI)NonAf  59.63 


 


Random Glucose  106 


 


Calcium  9.4 


 


Phosphorus  3.5 


 


Magnesium  2.2 


 


Total Bilirubin  0.4 


 


AST  17 


 


ALT  26 


 


Alkaline Phosphatase  85 


 


Total Protein  6.0 L 


 


Albumin  3.4 


 


Vitamin B12  307 


 


TSH  0.66 


 


RPR Titer   Nonreactive


 


Group A Strep Rapid  








 Current Medications











Generic Name Dose Route Start Last Admin





  Trade Name Freq  PRN Reason Stop Dose Admin


 


Atorvastatin Calcium  20 mg  11/21/19 22:00  11/21/19 22:06





  Lipitor -  PO   20 mg





  HS KATHIA   Administration





     





     





     





     


 


Benzocaine/Menthol  1 each  11/22/19 08:44  





  Cepacol Lozenge -  MM   





  Q2H PRN   





  SORE THROAT   





     





     





     


 


Finasteride  5 mg  11/22/19 10:00  11/22/19 09:08





  Proscar -  PO   5 mg





  DAILY KATHIA   Administration





     





     





     





     


 


Sodium Chloride  1,000 mls @ 75 mls/hr  11/21/19 18:24  11/22/19 09:07





  Normal Saline -  IV   75 mls/hr





  ASDIR KATHIA   Administration





     





     





     





     


 


Lisinopril  10 mg  11/21/19 22:00  11/22/19 09:08





  Prinivil  PO   10 mg





  BID KATHIA   Administration





     





     





     





     


 


Sertraline HCl  150 mg  11/22/19 10:00  11/22/19 09:07





  Zoloft -  PO   150 mg





  DAILY KATHIA   Administration





     





     





     





     


 


Solifenacin  5 mg  11/22/19 10:00  11/22/19 13:17





  Vesicare -  PO   5 mg





  DAILY KATHIA   Administration





     





     





     





     


 


Tamsulosin HCl  0.4 mg  11/22/19 08:30  11/22/19 08:45





  Flomax -  PO   0.4 mg





  DAILY@0830 KATHIA   Administration





     





     





     





     








 Home Medications











 Medication  Instructions  Recorded


 


Benazepril HCl 10 mg PO TID 05/15/13


 


Finasteride 5 mg PO DAILY 04/09/14


 


Atorvastatin Ca [Lipitor] 20 mg PO HS 11/21/19


 


Ranitidine HCl [Zantac] 150 mg PO BID 11/21/19


 


Sertraline HCl [Zoloft] 150 mg PO DAILY 11/21/19


 


Solifenacin Succinate [Vesicare -] 5 mg PO DAILY 11/21/19


 


Tamsulosin HCl [Flomax] 0.4 mg PO DAILY 11/21/19














ASSESSMENT AND PLAN:





74 year old male with history of HTN, HLD, NPH, BPH, and remote TB, presents 

with worsening gait instability over the past 2 months, with associated 

confusion and urinary incontinence.





1. NPH with progression of symptoms - gait instability, confusion, urinary 

incontinence.


CT Brain/MRI Brain - confirms dilatation of lateral/3rd/4th ventricles, no 

acute findings.


Neuro eval requested - further work-up as out-patient.


Continue Vesicare.


PT eval prior to discharge.


RPR non-reactive


TSH wnl





2. Pharyngitis, likely viral. Low grade fever - no clear bacterial infection


Throat Swab neg for Strep. Flu neg. 


CXR - no infiltrate. Pharyngeal Swab/RapidStrep and Blood Cx requested.


Received IV Ceftriaxone in ED. No further Abx as no evidence of ongoing 

bacterial infection.





3. HTN - Continue Benazepril





4. BPH - continue Flomax, Finasteride





5. HLD - on Statin.





6. Depression/Anxiety - on Zoloft.





7. B12 levels borderline. Will start B12 replacement.





Medically optimized for discharge given no recommendation for in-patient Ix/

intervention by Neuro. Urology for urodynamic studies as per Neuro.

## 2019-11-22 NOTE — EKG
Test Reason : 

Blood Pressure : ***/*** mmHG

Vent. Rate : 109 BPM     Atrial Rate : 109 BPM

   P-R Int : 144 ms          QRS Dur : 100 ms

    QT Int : 350 ms       P-R-T Axes : 045 -07 018 degrees

   QTc Int : 471 ms

 

SINUS TACHYCARDIA

WHEN COMPARED WITH ECG OF 14-MAY-2013 13:58,

VENT. RATE HAS INCREASED BY  46 BPM

Confirmed by ANDRA YANG MD ([id number]) on 11/22/2019 1:24:07 PM

 

Referred By:             Confirmed By:ANDRA YANG MD

## 2019-11-22 NOTE — DS
Physical Exam: 


SUBJECTIVE: Patient seen and examined at the bedside. Patient stated that he 

felt better however noted that he continued to have some difficulties with his 

gait. Also endorsed a sore throat. The patient denied cp, sob, abd pain, n/v/c/d

, fever, chills. 








OBJECTIVE:





 Vital Signs











 Period  Temp  Pulse  Resp  BP Sys/[place]ell  Pulse Ox


 


 Last 24 Hr  97.8 F-99.1 F    20-22  /54-89  96-99








PHYSICAL EXAM





GENERAL: Pleasant, NAD


HEAD: Normal with no signs of trauma.


EYES: EOMI Sclera Clear 


EARS, NOSE, THROAT: Dry MMM


NECK: No cervical lymphadenopathy appreciated. 


LUNGS: CTA b/l 


HEART: RRR S1S2 no murmurs


ABDOMEN: Soft non tender. No guarding or rigidity  


MUSCULOSKELETAL: full range of motion


LOWER EXTREMITIES: No significant pedel edema 


NEUROLOGICAL: CN 2-12 are intact. Upper extremities biceps/triceps 5/5. 

Sensation intact to light touch. Reflex 2+. LE: Strength 5/5, Dorsiflexion/

plantar flexion both lower extremities 5+. Sensation intact as well in lower 

extremities. Wide based ataxic gait


PSYCHIATRIC: Cooperative. Good eye contact. Appropriate mood and affect.


SKIN: Warm, dry, normal turgor, no rashes or lesions noted, normal capillary 

refill. 





LABS


 Laboratory Results - last 24 hr











  11/22/19 11/22/19 11/22/19





  06:30 06:30 06:30


 


WBC  5.1  


 


RBC  4.34  


 


Hgb  12.8  


 


Hct  37.9  


 


MCV  87.3  


 


MCH  29.6  


 


MCHC  33.8  


 


RDW  14.1  


 


Plt Count  181  


 


MPV  7.7  


 


Absolute Neuts (auto)  3.3  


 


Neutrophils %  64.2  


 


Lymphocytes %  18.0  D  


 


Monocytes %  16.0 H  


 


Eosinophils %  1.5  


 


Basophils %  0.3  


 


Nucleated RBC %  0  


 


PT with INR   13.60 H 


 


INR   1.15 H 


 


PTT (Actin FS)   32.2 


 


Sodium    140


 


Potassium    3.9


 


Chloride    110 H


 


Carbon Dioxide    27


 


Anion Gap    4 L


 


BUN    19.5 H


 


Creatinine    1.2


 


Est GFR (CKD-EPI)AfAm    69.11


 


Est GFR (CKD-EPI)NonAf    59.63


 


Random Glucose    106


 


Calcium    9.4


 


Phosphorus    3.5


 


Magnesium    2.2


 


Total Bilirubin    0.4


 


AST    17


 


ALT    26


 


Alkaline Phosphatase    85


 


Total Protein    6.0 L


 


Albumin    3.4


 


Vitamin B12    307


 


TSH    0.66


 


RPR Titer   














  11/22/19





  11:11


 


WBC 


 


RBC 


 


Hgb 


 


Hct 


 


MCV 


 


MCH 


 


MCHC 


 


RDW 


 


Plt Count 


 


MPV 


 


Absolute Neuts (auto) 


 


Neutrophils % 


 


Lymphocytes % 


 


Monocytes % 


 


Eosinophils % 


 


Basophils % 


 


Nucleated RBC % 


 


PT with INR 


 


INR 


 


PTT (Actin FS) 


 


Sodium 


 


Potassium 


 


Chloride 


 


Carbon Dioxide 


 


Anion Gap 


 


BUN 


 


Creatinine 


 


Est GFR (CKD-EPI)AfAm 


 


Est GFR (CKD-EPI)NonAf 


 


Random Glucose 


 


Calcium 


 


Phosphorus 


 


Magnesium 


 


Total Bilirubin 


 


AST 


 


ALT 


 


Alkaline Phosphatase 


 


Total Protein 


 


Albumin 


 


Vitamin B12 


 


TSH 


 


RPR Titer  Nonreactive











HOSPITAL COURSE:





Davy Bennett is a 74 year old male with a past medical history of HTN, HLD, 

NPH, and Tuberculosis (as a child) who presented to [facility] at the behest of his 

primary care physician due to worsening gait instability. The patient was 

admitted for gait instability and likely NPH. Patient had CT done that showed 

marked to moderate dilation of the lateral and third ventciles with small 

fourth ventricle. Dr. Briseno, neurology saw the patient and noted the patient 

should follow up outpatient with neurology, follow up with urology for urinary 

incontinence, and prescribed a walker for home. Patient to go home with home 

PT. MRI head showed moderate dilation of the lateral and third ventricles and 

almost normal 4th ventricle. Blood work analysis was all within normal limits. 

Patient was seen by PT that stated that the patient was safe to go home with 

home physical therapy.


Patient was spoken to and acknowledged the plan and voiced agreement. The 

patient will follow up with primary care, neurology, and urology and use a 

walker.


The patient was discharged in stable medical condition. 





Date of Admission:11/21/19





Date of Discharge: 11/22/19











Minutes to complete discharge: 35





Discharge Summary


Problems reviewed: Yes


Reason For Visit: UNSTEADY GAIT


Condition: Improved





- Instructions


Diet, Activity, Other Instructions: 


You were admitted to the hospital because you were having increasing problems 

with your walking. You had a CT Scan and MRI of your head which did show an 

enlargement of the ventricles (fluid filled spaces) of your brain which may be 

suggestive of your symptoms of trouble walking, memory loss, and increased 

urination. You were seen by the neurologist, Dr. Briseno, who recommended that 

you use a walker to walk at home. Dr. Briseno also recommended that you follow 

up with him as an outpatient, that you follow up with a urologist (urinary and 

bladder system doctor) for studies related to your urination problems.





MEDICATIONS





Please continue taking all of your home medications as prescribed.





REFERRALS





Please follow up with Dr. Black Hernandez, your primary care provider within 1 

week.





Please follow up with Dr. Nick Briseno, neurologist, within 1 week.





Please follow up with Dr. Jessenia Garcia, urologist, within 1 week.








If you have any symptoms of dizziness, lightheadedness, increased confusion, 

falls, fevers, or other general feelings of unwellness please call 911 or go to 

your nearest emergency room.


Referrals: 


Black Hernandez MD [Primary Care Provider] - 1 Week


Nick Briseno MD [Staff Physician] - 1 Week


Jessenia Garcia MD [Staff Physician] - 1 Week


Disposition: HOME





- Home Medications


Comprehensive Discharge Medication List: 


Ambulatory Orders





Benazepril HCl 10 mg PO TID 05/15/13 


Finasteride 5 mg PO DAILY 04/09/14 


Atorvastatin Ca [Lipitor] 20 mg PO HS 11/21/19 


Ranitidine HCl [Zantac] 150 mg PO BID 11/21/19 


Sertraline HCl [Zoloft] 150 mg PO DAILY 11/21/19 


Solifenacin Succinate [Vesicare -] 5 mg PO DAILY 11/21/19 


Tamsulosin HCl [Flomax] 0.4 mg PO DAILY 11/21/19 


Cyanocobalamin [Vitamin B12 -] 1,000 mcg PO DAILY 30 Days #30 tablet 11/22/19 











Problem List





- Problems


(1) Gait instability


Code(s): R26.81 - UNSTEADINESS ON FEET   





(2) NPH (normal pressure hydrocephalus)


Code(s): G91.2 - (IDIOPATHIC) NORMAL PRESSURE HYDROCEPHALUS   


This patient is new to me today: No


Emergency Visit: Yes


ED Registration Date: 11/21/19


Care time: The patient presented to the Emergency Department on the above date 

and was hospitalized for further evaluation of their emergent condition.


Critical Care patient: No





- Discharge Referral


Referred to [facility] No